# Patient Record
Sex: FEMALE | Race: WHITE
[De-identification: names, ages, dates, MRNs, and addresses within clinical notes are randomized per-mention and may not be internally consistent; named-entity substitution may affect disease eponyms.]

---

## 2018-02-02 ENCOUNTER — HOSPITAL ENCOUNTER (EMERGENCY)
Dept: HOSPITAL 26 - MED | Age: 58
LOS: 1 days | Discharge: HOME | End: 2018-02-03
Payer: COMMERCIAL

## 2018-02-02 VITALS — HEIGHT: 61 IN | BODY MASS INDEX: 37 KG/M2 | WEIGHT: 196 LBS

## 2018-02-02 VITALS — DIASTOLIC BLOOD PRESSURE: 82 MMHG | SYSTOLIC BLOOD PRESSURE: 142 MMHG

## 2018-02-02 DIAGNOSIS — J06.9: ICD-10-CM

## 2018-02-02 DIAGNOSIS — Z88.5: ICD-10-CM

## 2018-02-02 DIAGNOSIS — K52.9: Primary | ICD-10-CM

## 2018-02-02 DIAGNOSIS — Z88.1: ICD-10-CM

## 2018-02-02 NOTE — NUR
Pt came EMS for body aches, chills, cough, n/v, x3 days. Pt denies vomiting 
episodes. Pt A&Ox4. Lung soudns clear thoughout, pt does have non-productive 
"bark-like" cough. pt states pain 5/10, and pain when coughing. VSS. ERMD 
aware.  at bedside. Continue to monitor.

## 2018-02-03 VITALS — DIASTOLIC BLOOD PRESSURE: 82 MMHG | SYSTOLIC BLOOD PRESSURE: 142 MMHG

## 2018-02-03 NOTE — NUR
Patient discharged with v/s stable. Written and verbal after care instructions 
given and explained. 

Patient alert, oriented and verbalized understanding of instructions. 
Ambulatory with steady gait. All questions addressed prior to discharge. ID 
band removed. Patient advised to follow up with PMD. Rx of Ibuprofen, Zofran, 
Codeine Phosphate/Guaifenesin given. Patient educated on indication of 
medication including possible reaction and side effects. Opportunity to ask 
questions provided and answered.

## 2019-09-07 ENCOUNTER — HOSPITAL ENCOUNTER (INPATIENT)
Dept: HOSPITAL 26 - MED | Age: 59
LOS: 1 days | Discharge: HOME | DRG: 241 | End: 2019-09-08
Payer: COMMERCIAL

## 2019-09-07 VITALS
SYSTOLIC BLOOD PRESSURE: 128 MMHG | HEIGHT: 61 IN | DIASTOLIC BLOOD PRESSURE: 75 MMHG | WEIGHT: 165 LBS | BODY MASS INDEX: 31.15 KG/M2

## 2019-09-07 DIAGNOSIS — E87.6: ICD-10-CM

## 2019-09-07 DIAGNOSIS — I10: ICD-10-CM

## 2019-09-07 DIAGNOSIS — Z88.5: ICD-10-CM

## 2019-09-07 DIAGNOSIS — Z88.0: ICD-10-CM

## 2019-09-07 DIAGNOSIS — K29.00: Primary | ICD-10-CM

## 2019-09-07 DIAGNOSIS — E11.9: ICD-10-CM

## 2019-09-07 DIAGNOSIS — E44.1: ICD-10-CM

## 2019-09-07 LAB
ALBUMIN FLD-MCNC: 3.1 G/DL (ref 3.4–5)
ANION GAP SERPL CALCULATED.3IONS-SCNC: 15.4 MMOL/L (ref 8–16)
AST SERPL-CCNC: 79 U/L (ref 15–37)
BASOPHILS # BLD AUTO: 0.1 K/UL (ref 0–0.22)
BASOPHILS NFR BLD AUTO: 1.4 % (ref 0–2)
BILIRUB SERPL-MCNC: 0.4 MG/DL (ref 0–1)
BUN SERPL-MCNC: 5 MG/DL (ref 7–18)
CHLORIDE SERPL-SCNC: 101 MMOL/L (ref 98–107)
CO2 SERPL-SCNC: 23.9 MMOL/L (ref 21–32)
CREAT SERPL-MCNC: 0.7 MG/DL (ref 0.6–1.3)
EOSINOPHIL # BLD AUTO: 0.2 K/UL (ref 0–0.4)
EOSINOPHIL NFR BLD AUTO: 4 % (ref 0–4)
ERYTHROCYTE [DISTWIDTH] IN BLOOD BY AUTOMATED COUNT: 12.9 % (ref 11.6–13.7)
GFR SERPL CREATININE-BSD FRML MDRD: 110 ML/MIN (ref 90–?)
GLUCOSE SERPL-MCNC: 109 MG/DL (ref 74–106)
HCT VFR BLD AUTO: 36.6 % (ref 36–48)
HGB BLD-MCNC: 12.5 G/DL (ref 12–16)
LYMPHOCYTES # BLD AUTO: 2.2 K/UL (ref 2.5–16.5)
LYMPHOCYTES NFR BLD AUTO: 44 % (ref 20.5–51.1)
MCH RBC QN AUTO: 35 PG (ref 27–31)
MCHC RBC AUTO-ENTMCNC: 34 G/DL (ref 33–37)
MCV RBC AUTO: 102.1 FL (ref 80–94)
MONOCYTES # BLD AUTO: 0.5 K/UL (ref 0.8–1)
MONOCYTES NFR BLD AUTO: 10.3 % (ref 1.7–9.3)
NEUTROPHILS # BLD AUTO: 2 K/UL (ref 1.8–7.7)
NEUTROPHILS NFR BLD AUTO: 40.3 % (ref 42.2–75.2)
PLATELET # BLD AUTO: 188 K/UL (ref 140–450)
POTASSIUM SERPL-SCNC: 3.3 MMOL/L (ref 3.5–5.1)
PROTHROMBIN TIME: 10 SECS (ref 10.8–13.4)
RBC # BLD AUTO: 3.59 MIL/UL (ref 4.2–5.4)
SODIUM SERPL-SCNC: 137 MMOL/L (ref 136–145)
WBC # BLD AUTO: 4.9 K/UL (ref 4.8–10.8)

## 2019-09-07 NOTE — NUR
58 Y/O FEMALE BIB [facility] FROM HOME. PT PRESENTS TO ED, C/O CHEST 7/10 DOES 
NOT RADIATE. PER REPORT FROM [facility], PT HAD 2 EPISODES OF NAUSEA AND 
VOMITTING. PT DENIES ANY ACTION PROVOKING CHEST PAIN. PARAMEDIC STATES GIVING 1 
DOSE ASPIRIN, 1 DOSE NITRO, AND ZOFRAN IV PUSH. PT HAS HX OF HTN AND STATES 
BEING NON MED COMPLIANT TO BP MEDS X 3 WEEKS. PT PLACED ON 2 L O2 NC FOR 
COMFORT MEASURES, 98% SP02. PT ON CARDIAC MONITOR. PT VSS. ERMD AWARE. WILL 
CONTINUE TO MONITOR.

## 2019-09-08 VITALS — DIASTOLIC BLOOD PRESSURE: 75 MMHG | SYSTOLIC BLOOD PRESSURE: 118 MMHG

## 2019-09-08 VITALS — SYSTOLIC BLOOD PRESSURE: 123 MMHG | DIASTOLIC BLOOD PRESSURE: 74 MMHG

## 2019-09-08 VITALS — DIASTOLIC BLOOD PRESSURE: 67 MMHG | SYSTOLIC BLOOD PRESSURE: 123 MMHG

## 2019-09-08 LAB
CHOLEST/HDLC SERPL: 1.9 {RATIO} (ref 1–4.5)
HDLC SERPL-MCNC: 96 MG/DL (ref 40–60)
LDLC SERPL CALC-MCNC: 80 MG/DL (ref 60–100)
TRIGL SERPL-MCNC: 41 MG/DL (ref 30–150)

## 2019-09-08 NOTE — NUR
PT ADMITTED TO THE UNIT FROM ED. PATIENT IS AWAKE, ALERT AND ORIENTED. PT ABLE TO AMBULATE 
TO THE BED. PATIENT ON ROOM AIR. NO SOB OR S/S OF DISTRESS. PATIENT DENIES CHEST PAIN OR ANY 
DISCOMFORT AT THIS TIME. SCATTERED SCABS NOTED TO BLE. PATIENT PLACED ON TELE MONITORING. 
BED LOWERED WITH CALL LIGHT WITHIN REACH. WILL CONTINUE TO MONITOR

## 2019-09-08 NOTE — NUR
RECEIVED BEDSIDE REPORT FROM NIGHT SHIFT NURSE ABILIO. PT IS ASLEEP, BUT AROUSABLE, NO S/S 
OF ACUTE DISTRESS OR C/O PAIN. NO C/O CHEST PAIN OR SOB. PT IS ON ROOM AIR, SKIN IS INTACT. 
IV SITE IS ON THE R HAND 20 G, SALINE LOCKED. PT IS AMBULATORY AND NOT A FALL RISK. CALL 
LIGHT IS WITHIN REACH. WILL CONTINUE TO MONITOR.

## 2019-09-08 NOTE — NUR
PT HAS DISCHARGED HOME. IV SITE AND WRIST BANDS REMOVED. PT WAS GIVEN DISCHARGE INSTRUCTIONS 
TO WHICH SHE VERBALIZED UNDERSTANDING. PT LEFT WITH ALL HER BELONGINGS IN STABLE CONDITION, 
WITH HER . PT WAS PROVIDED A BUS PASS.

## 2019-09-08 NOTE — NUR
GOT PHONE CALL FROM PHARMACY, PHARMACIST SAID THAT THERE ARE ORDERS FROM DR NUNEZ FOR 
MORPHINE AND NORCO, BUT PT IS ALLERGIC TO CODEINE. I ALERTED DR NUNEZ ABOUT THIS, HE SAID 
THAT THERE IS NO ASSOCIATION BETWEEN CODEINE AND THOSE TWO MEDICATIONS. PT HAS NOT BEEN C/O 
PAIN ANYWAY. 

-------------------------------------------------------------------------------

Addendum: 09/08/19 at 1013 by Vanessa Nicolas RN

-------------------------------------------------------------------------------

PHARMACIST AWARE

## 2019-09-08 NOTE — NUR
PT ADMITTED TO [facility] RM 111B. REPORT GIVEN TO ABILIO TREJO. PT STABLE AT TRANSFER. 
PT CARE TRANSFERRED TO RECEIVING RN.

## 2019-09-08 NOTE — NUR
PT VISITING WITH RELATIVE AT BEDSIDE. NO S/S OF ACUTE DISTRESS OT SOB. NO C/O CHEST PAIN. 
WILL CONTINUE TO MONITOR

## 2019-09-10 NOTE — NUR
CONTACTED PATIENT'S PCP DR ANGELIA SUTTON'S OFFICE -[phone], ABLE TO SPEAK TO ISA.  SHE 
STATED THAT PATIENT HAS AN APPOINTMENT TODAY AT 1410 PM.   CONTACTED PATIENT [phone] 
SO MANY TIMES, LINE IS BUSY.  WILL FOLLOW UP.

## 2019-09-15 ENCOUNTER — HOSPITAL ENCOUNTER (EMERGENCY)
Dept: HOSPITAL 26 - MED | Age: 59
Discharge: HOME | End: 2019-09-15
Payer: COMMERCIAL

## 2019-09-15 VITALS — HEIGHT: 61 IN | BODY MASS INDEX: 28.32 KG/M2 | WEIGHT: 150 LBS

## 2019-09-15 VITALS — SYSTOLIC BLOOD PRESSURE: 135 MMHG | DIASTOLIC BLOOD PRESSURE: 78 MMHG

## 2019-09-15 DIAGNOSIS — Z98.890: ICD-10-CM

## 2019-09-15 DIAGNOSIS — M54.5: Primary | ICD-10-CM

## 2019-09-15 DIAGNOSIS — Z79.899: ICD-10-CM

## 2019-09-15 DIAGNOSIS — Z88.5: ICD-10-CM

## 2019-09-15 DIAGNOSIS — I10: ICD-10-CM

## 2019-09-15 DIAGNOSIS — Z88.0: ICD-10-CM

## 2019-09-15 PROCEDURE — 99283 EMERGENCY DEPT VISIT LOW MDM: CPT

## 2019-09-15 PROCEDURE — 96372 THER/PROPH/DIAG INJ SC/IM: CPT

## 2019-09-15 PROCEDURE — 81002 URINALYSIS NONAUTO W/O SCOPE: CPT

## 2020-06-21 ENCOUNTER — HOSPITAL ENCOUNTER (OUTPATIENT)
Dept: HOSPITAL 26 - MED | Age: 60
Setting detail: OBSERVATION
LOS: 1 days | Discharge: HOME | End: 2020-06-22
Payer: COMMERCIAL

## 2020-06-21 VITALS — SYSTOLIC BLOOD PRESSURE: 134 MMHG | DIASTOLIC BLOOD PRESSURE: 86 MMHG

## 2020-06-21 VITALS — WEIGHT: 132 LBS | BODY MASS INDEX: 24.92 KG/M2 | HEIGHT: 61 IN

## 2020-06-21 DIAGNOSIS — R74.0: ICD-10-CM

## 2020-06-21 DIAGNOSIS — Z79.899: ICD-10-CM

## 2020-06-21 DIAGNOSIS — R07.89: Primary | ICD-10-CM

## 2020-06-21 DIAGNOSIS — E87.1: ICD-10-CM

## 2020-06-21 DIAGNOSIS — Z88.5: ICD-10-CM

## 2020-06-21 DIAGNOSIS — E11.9: ICD-10-CM

## 2020-06-21 DIAGNOSIS — I95.9: ICD-10-CM

## 2020-06-21 DIAGNOSIS — I10: ICD-10-CM

## 2020-06-21 DIAGNOSIS — F10.129: ICD-10-CM

## 2020-06-21 DIAGNOSIS — Z88.0: ICD-10-CM

## 2020-06-21 LAB
ALBUMIN FLD-MCNC: 3.8 G/DL (ref 3.4–5)
ANION GAP SERPL CALCULATED.3IONS-SCNC: 15.3 MMOL/L (ref 8–16)
APPEARANCE UR: CLEAR
AST SERPL-CCNC: 98 U/L (ref 15–37)
BARBITURATES UR QL SCN: NEGATIVE NG/ML
BASOPHILS # BLD AUTO: 0.1 K/UL (ref 0–0.22)
BASOPHILS NFR BLD AUTO: 1.5 % (ref 0–2)
BENZODIAZ UR QL SCN: NEGATIVE NG/ML
BILIRUB SERPL-MCNC: 0.7 MG/DL (ref 0–1)
BILIRUB UR QL STRIP: NEGATIVE
BUN SERPL-MCNC: 7 MG/DL (ref 7–18)
BZE UR QL SCN: NEGATIVE NG/ML
CANNABINOIDS UR QL SCN: NEGATIVE NG/ML
CHLORIDE SERPL-SCNC: 92 MMOL/L (ref 98–107)
CO2 SERPL-SCNC: 25.2 MMOL/L (ref 21–32)
COLOR UR: YELLOW
CREAT SERPL-MCNC: 0.8 MG/DL (ref 0.6–1.3)
EOSINOPHIL # BLD AUTO: 0.1 K/UL (ref 0–0.4)
EOSINOPHIL NFR BLD AUTO: 1.7 % (ref 0–4)
ERYTHROCYTE [DISTWIDTH] IN BLOOD BY AUTOMATED COUNT: 13.8 % (ref 11.6–13.7)
GFR SERPL CREATININE-BSD FRML MDRD: 94 ML/MIN (ref 90–?)
GLUCOSE SERPL-MCNC: 101 MG/DL (ref 74–106)
GLUCOSE UR STRIP-MCNC: NEGATIVE MG/DL
HCT VFR BLD AUTO: 36.9 % (ref 36–48)
HGB BLD-MCNC: 12.7 G/DL (ref 12–16)
HGB UR QL STRIP: NEGATIVE
LEUKOCYTE ESTERASE UR QL STRIP: NEGATIVE
LYMPHOCYTES # BLD AUTO: 2.2 K/UL (ref 2.5–16.5)
LYMPHOCYTES NFR BLD AUTO: 27.6 % (ref 20.5–51.1)
MCH RBC QN AUTO: 35 PG (ref 27–31)
MCHC RBC AUTO-ENTMCNC: 35 G/DL (ref 33–37)
MCV RBC AUTO: 102.5 FL (ref 80–94)
MONOCYTES # BLD AUTO: 0.9 K/UL (ref 0.8–1)
MONOCYTES NFR BLD AUTO: 11.5 % (ref 1.7–9.3)
NEUTROPHILS # BLD AUTO: 4.6 K/UL (ref 1.8–7.7)
NEUTROPHILS NFR BLD AUTO: 57.7 % (ref 42.2–75.2)
NITRITE UR QL STRIP: NEGATIVE
OPIATES UR QL SCN: NEGATIVE NG/ML
PCP UR QL SCN: NEGATIVE NG/ML
PH UR STRIP: 5.5 [PH] (ref 5–9)
PLATELET # BLD AUTO: 170 K/UL (ref 140–450)
POTASSIUM SERPL-SCNC: 3.5 MMOL/L (ref 3.5–5.1)
RBC # BLD AUTO: 3.6 MIL/UL (ref 4.2–5.4)
SODIUM SERPL-SCNC: 129 MMOL/L (ref 136–145)
WBC # BLD AUTO: 8 K/UL (ref 4.8–10.8)

## 2020-06-21 PROCEDURE — 83605 ASSAY OF LACTIC ACID: CPT

## 2020-06-21 PROCEDURE — 80048 BASIC METABOLIC PNL TOTAL CA: CPT

## 2020-06-21 PROCEDURE — 36415 COLL VENOUS BLD VENIPUNCTURE: CPT

## 2020-06-21 PROCEDURE — 80305 DRUG TEST PRSMV DIR OPT OBS: CPT

## 2020-06-21 PROCEDURE — G0378 HOSPITAL OBSERVATION PER HR: HCPCS

## 2020-06-21 PROCEDURE — 87040 BLOOD CULTURE FOR BACTERIA: CPT

## 2020-06-21 PROCEDURE — 71045 X-RAY EXAM CHEST 1 VIEW: CPT

## 2020-06-21 PROCEDURE — 96374 THER/PROPH/DIAG INJ IV PUSH: CPT

## 2020-06-21 PROCEDURE — 84484 ASSAY OF TROPONIN QUANT: CPT

## 2020-06-21 PROCEDURE — 93005 ELECTROCARDIOGRAM TRACING: CPT

## 2020-06-21 PROCEDURE — G0482 DRUG TEST DEF 15-21 CLASSES: HCPCS

## 2020-06-21 PROCEDURE — 83735 ASSAY OF MAGNESIUM: CPT

## 2020-06-21 PROCEDURE — 96375 TX/PRO/DX INJ NEW DRUG ADDON: CPT

## 2020-06-21 PROCEDURE — 80053 COMPREHEN METABOLIC PANEL: CPT

## 2020-06-21 PROCEDURE — 93307 TTE W/O DOPPLER COMPLETE: CPT

## 2020-06-21 PROCEDURE — 87081 CULTURE SCREEN ONLY: CPT

## 2020-06-21 PROCEDURE — 96361 HYDRATE IV INFUSION ADD-ON: CPT

## 2020-06-21 PROCEDURE — 99285 EMERGENCY DEPT VISIT HI MDM: CPT

## 2020-06-21 PROCEDURE — 83880 ASSAY OF NATRIURETIC PEPTIDE: CPT

## 2020-06-21 PROCEDURE — 81003 URINALYSIS AUTO W/O SCOPE: CPT

## 2020-06-21 PROCEDURE — 85379 FIBRIN DEGRADATION QUANT: CPT

## 2020-06-21 PROCEDURE — 85025 COMPLETE CBC W/AUTO DIFF WBC: CPT

## 2020-06-21 PROCEDURE — 84100 ASSAY OF PHOSPHORUS: CPT

## 2020-06-21 NOTE — NUR
60F PRESENTS TO ED BIBA ALS FROM [facility] IN [place] FOR STERNAL CHEST PAIN AFTER 
GETTING INTO A VERBAL ARGUMENT WITH SON. PT STATES PAIN IS NONRADIATING. DENIES 
SOB/COUGH. HEART SOUNDS EVEN AND REGULAR. RR EVEN AND UNLABORED. DENIES N/V/D. 





PMHX: HTN, DM, HLD.

RX: DENIES

ALLX: PCN, CODEINE



NEGATIVE FOR COVID SCREENING

WEARING MASK

PLACED ON CARDIAC MONITORING

## 2020-06-22 VITALS — DIASTOLIC BLOOD PRESSURE: 67 MMHG | SYSTOLIC BLOOD PRESSURE: 131 MMHG

## 2020-06-22 VITALS — SYSTOLIC BLOOD PRESSURE: 108 MMHG | DIASTOLIC BLOOD PRESSURE: 56 MMHG

## 2020-06-22 LAB
ANION GAP SERPL CALCULATED.3IONS-SCNC: 10.8 MMOL/L (ref 8–16)
BASOPHILS # BLD AUTO: 0.1 K/UL (ref 0–0.22)
BASOPHILS NFR BLD AUTO: 1.6 % (ref 0–2)
BUN SERPL-MCNC: 5 MG/DL (ref 7–18)
CHLORIDE SERPL-SCNC: 109 MMOL/L (ref 98–107)
CO2 SERPL-SCNC: 24 MMOL/L (ref 21–32)
CREAT SERPL-MCNC: 0.8 MG/DL (ref 0.6–1.3)
EOSINOPHIL # BLD AUTO: 0.1 K/UL (ref 0–0.4)
EOSINOPHIL NFR BLD AUTO: 3.2 % (ref 0–4)
ERYTHROCYTE [DISTWIDTH] IN BLOOD BY AUTOMATED COUNT: 14 % (ref 11.6–13.7)
GFR SERPL CREATININE-BSD FRML MDRD: 94 ML/MIN (ref 90–?)
GLUCOSE SERPL-MCNC: 80 MG/DL (ref 74–106)
HCT VFR BLD AUTO: 33.8 % (ref 36–48)
HGB BLD-MCNC: 11.4 G/DL (ref 12–16)
LYMPHOCYTES # BLD AUTO: 1.1 K/UL (ref 2.5–16.5)
LYMPHOCYTES NFR BLD AUTO: 24.4 % (ref 20.5–51.1)
MAGNESIUM SERPL-MCNC: 1.7 MG/DL (ref 1.8–2.4)
MCH RBC QN AUTO: 35 PG (ref 27–31)
MCHC RBC AUTO-ENTMCNC: 34 G/DL (ref 33–37)
MCV RBC AUTO: 104.3 FL (ref 80–94)
MONOCYTES # BLD AUTO: 0.5 K/UL (ref 0.8–1)
MONOCYTES NFR BLD AUTO: 10.7 % (ref 1.7–9.3)
NEUTROPHILS # BLD AUTO: 2.6 K/UL (ref 1.8–7.7)
NEUTROPHILS NFR BLD AUTO: 60.1 % (ref 42.2–75.2)
PHOSPHATE SERPL-MCNC: 2.8 MG/DL (ref 2.5–4.9)
PLATELET # BLD AUTO: 158 K/UL (ref 140–450)
POTASSIUM SERPL-SCNC: 3.8 MMOL/L (ref 3.5–5.1)
RBC # BLD AUTO: 3.24 MIL/UL (ref 4.2–5.4)
SODIUM SERPL-SCNC: 140 MMOL/L (ref 136–145)
WBC # BLD AUTO: 4.3 K/UL (ref 4.8–10.8)

## 2020-06-22 RX ADMIN — SODIUM CHLORIDE SCH MLS/HR: 9 INJECTION, SOLUTION INTRAVENOUS at 03:54

## 2020-06-22 RX ADMIN — SODIUM CHLORIDE SCH MLS/HR: 9 INJECTION, SOLUTION INTRAVENOUS at 15:50

## 2020-06-22 NOTE — NUR
Patient will be admitted to care of  DR PAN. Admited to TELE.  Will go to room 
125A. Belongings list completed.  Report to MAYA MILLS.

## 2020-06-22 NOTE — NUR
DISCHARGE PLANNING:



THIS IS A 61 Y/O FEMALE PATIENT FROM HOME, WHO WAS BIBA DUE TO NON RADIATING SUBSTERNAL 
CHEST PAIN.  PAST MEDICAL HISTORY INCLUDE DIABETES AND HTN.  INITIAL DIAGNOSIS OF CHEST 
PAIN, ETOH INTOXICATION.  CURRENT LABS INCLUDE WBC 4.3, H/H 11.4/33.8, NA/K 140/3.8, 
BUN/CREA 5/0.8.  UDS SHOWED ALCOHOL LEVEL .  NO CONSULTS AT THIS TIME, PENDING 
ATTENDING'S RECOMMENDATION.  DC PLAN BACK TO HOME ONCE STABLE.

## 2020-06-22 NOTE — NUR
PT RESPONSIVE TO VERBAL STIMULI. PT RESTING IN BED EYES CLOSED. RESPIRATIONS 
ARE EVEN AND UNLABORED. SKIN IS WARM AND DRY TO TOUCH. PT DENIES CHEST PAIN AT 
THIS TIME. PT CONTINUES ON CARDIAC MONITOR. BLANKET OFFERED FOR COMFORT. BED 
LOCKED AND IN LOWEST POSITION.

## 2020-06-22 NOTE — NUR
SPOKE WITH DR. SHEFFIELD AND NOTIFIED HIM OF PT BP DECREASED TO: 90/45. GAVE NEW 
ORDER FOR ANOTHER BOLUS OF 0.9% NS 1L.

## 2020-06-22 NOTE — NUR
PT AMBULATED TO BED FROM RESTROOM WITH STEADY GAIT. PT PLACED ON CARDIAC 
MONITOR. BED LOCKED AND IN LOWEST POSITION.

## 2020-06-22 NOTE — NUR
PATIENT HAS BEEN SCREENED AND CATEGORIZED AS MODERATE NUTRITION RISK. PATIENT WILL BE SEEN 
WITHIN 3-5 DAYS OF ADMISSION.



06/24/20 06/26/20



[address]NA [address]